# Patient Record
Sex: MALE | Race: WHITE | NOT HISPANIC OR LATINO | Employment: FULL TIME | URBAN - METROPOLITAN AREA
[De-identification: names, ages, dates, MRNs, and addresses within clinical notes are randomized per-mention and may not be internally consistent; named-entity substitution may affect disease eponyms.]

---

## 2019-03-16 ENCOUNTER — HOSPITAL ENCOUNTER (EMERGENCY)
Facility: HOSPITAL | Age: 49
Discharge: HOME | End: 2019-03-16
Attending: EMERGENCY MEDICINE | Admitting: EMERGENCY MEDICINE

## 2019-03-16 ENCOUNTER — APPOINTMENT (OUTPATIENT)
Dept: CT IMAGING | Facility: HOSPITAL | Age: 49
End: 2019-03-16

## 2019-03-16 VITALS
TEMPERATURE: 98.1 F | WEIGHT: 315 LBS | RESPIRATION RATE: 18 BRPM | HEIGHT: 75 IN | HEART RATE: 94 BPM | OXYGEN SATURATION: 92 % | BODY MASS INDEX: 39.17 KG/M2 | DIASTOLIC BLOOD PRESSURE: 60 MMHG | SYSTOLIC BLOOD PRESSURE: 145 MMHG

## 2019-03-16 DIAGNOSIS — L03.211 FACIAL CELLULITIS: ICD-10-CM

## 2019-03-16 DIAGNOSIS — K04.7 ABSCESS, PERIAPICAL: Primary | ICD-10-CM

## 2019-03-16 LAB
ANION GAP SERPL CALCULATED.3IONS-SCNC: 6 MMOL/L (ref 3–11)
BASOPHILS # BLD AUTO: 0.02 10*3/MM3 (ref 0–0.2)
BASOPHILS NFR BLD AUTO: 0.2 % (ref 0–1)
BUN BLD-MCNC: 13 MG/DL (ref 9–23)
BUN/CREAT SERPL: 13.1 (ref 7–25)
CALCIUM SPEC-SCNC: 9.1 MG/DL (ref 8.7–10.4)
CHLORIDE SERPL-SCNC: 107 MMOL/L (ref 99–109)
CO2 SERPL-SCNC: 26 MMOL/L (ref 20–31)
CREAT BLD-MCNC: 0.99 MG/DL (ref 0.6–1.3)
DEPRECATED RDW RBC AUTO: 44.7 FL (ref 37–54)
EOSINOPHIL # BLD AUTO: 0.28 10*3/MM3 (ref 0–0.3)
EOSINOPHIL NFR BLD AUTO: 3.3 % (ref 0–3)
ERYTHROCYTE [DISTWIDTH] IN BLOOD BY AUTOMATED COUNT: 13.5 % (ref 11.3–14.5)
GFR SERPL CREATININE-BSD FRML MDRD: 81 ML/MIN/1.73
GFR SERPL CREATININE-BSD FRML MDRD: 99 ML/MIN/1.73
GLUCOSE BLD-MCNC: 100 MG/DL (ref 70–100)
HCT VFR BLD AUTO: 47.8 % (ref 38.9–50.9)
HGB BLD-MCNC: 15.8 G/DL (ref 13.1–17.5)
IMM GRANULOCYTES # BLD AUTO: 0.02 10*3/MM3 (ref 0–0.05)
IMM GRANULOCYTES NFR BLD AUTO: 0.2 % (ref 0–0.6)
LYMPHOCYTES # BLD AUTO: 1.37 10*3/MM3 (ref 0.6–4.8)
LYMPHOCYTES NFR BLD AUTO: 16.2 % (ref 24–44)
MCH RBC QN AUTO: 29.8 PG (ref 27–31)
MCHC RBC AUTO-ENTMCNC: 33.1 G/DL (ref 32–36)
MCV RBC AUTO: 90.2 FL (ref 80–99)
MONOCYTES # BLD AUTO: 0.84 10*3/MM3 (ref 0–1)
MONOCYTES NFR BLD AUTO: 9.9 % (ref 0–12)
NEUTROPHILS # BLD AUTO: 5.97 10*3/MM3 (ref 1.5–8.3)
NEUTROPHILS NFR BLD AUTO: 70.4 % (ref 41–71)
PLATELET # BLD AUTO: 202 10*3/MM3 (ref 150–450)
PMV BLD AUTO: 9.2 FL (ref 6–12)
POTASSIUM BLD-SCNC: 3.6 MMOL/L (ref 3.5–5.5)
RBC # BLD AUTO: 5.3 10*6/MM3 (ref 4.2–5.76)
SODIUM BLD-SCNC: 139 MMOL/L (ref 132–146)
WBC NRBC COR # BLD: 8.48 10*3/MM3 (ref 3.5–10.8)

## 2019-03-16 PROCEDURE — 70487 CT MAXILLOFACIAL W/DYE: CPT

## 2019-03-16 PROCEDURE — 25010000002 IOPAMIDOL 61 % SOLUTION: Performed by: EMERGENCY MEDICINE

## 2019-03-16 PROCEDURE — 96375 TX/PRO/DX INJ NEW DRUG ADDON: CPT

## 2019-03-16 PROCEDURE — 85025 COMPLETE CBC W/AUTO DIFF WBC: CPT | Performed by: EMERGENCY MEDICINE

## 2019-03-16 PROCEDURE — 80048 BASIC METABOLIC PNL TOTAL CA: CPT | Performed by: EMERGENCY MEDICINE

## 2019-03-16 PROCEDURE — 96365 THER/PROPH/DIAG IV INF INIT: CPT

## 2019-03-16 PROCEDURE — 25010000002 CEFTRIAXONE PER 250 MG: Performed by: EMERGENCY MEDICINE

## 2019-03-16 PROCEDURE — 25010000002 DIPHENHYDRAMINE PER 50 MG: Performed by: EMERGENCY MEDICINE

## 2019-03-16 PROCEDURE — 99283 EMERGENCY DEPT VISIT LOW MDM: CPT

## 2019-03-16 RX ORDER — AMOXICILLIN AND CLAVULANATE POTASSIUM 875; 125 MG/1; MG/1
1 TABLET, FILM COATED ORAL 2 TIMES DAILY
Qty: 16 TABLET | Refills: 0 | Status: SHIPPED | OUTPATIENT
Start: 2019-03-16 | End: 2019-03-16

## 2019-03-16 RX ORDER — CLINDAMYCIN HYDROCHLORIDE 300 MG/1
300 CAPSULE ORAL 3 TIMES DAILY
Qty: 24 CAPSULE | Refills: 0 | Status: SHIPPED | OUTPATIENT
Start: 2019-03-16 | End: 2019-03-24

## 2019-03-16 RX ORDER — SACCHAROMYCES BOULARDII 250 MG
250 CAPSULE ORAL 2 TIMES DAILY
Qty: 20 CAPSULE | Refills: 0 | Status: SHIPPED | OUTPATIENT
Start: 2019-03-16

## 2019-03-16 RX ORDER — SODIUM CHLORIDE 0.9 % (FLUSH) 0.9 %
10 SYRINGE (ML) INJECTION AS NEEDED
Status: DISCONTINUED | OUTPATIENT
Start: 2019-03-16 | End: 2019-03-16 | Stop reason: HOSPADM

## 2019-03-16 RX ORDER — CEFTRIAXONE SODIUM 1 G/50ML
1 INJECTION, SOLUTION INTRAVENOUS ONCE
Status: COMPLETED | OUTPATIENT
Start: 2019-03-16 | End: 2019-03-16

## 2019-03-16 RX ORDER — DIPHENHYDRAMINE HYDROCHLORIDE 50 MG/ML
25 INJECTION INTRAMUSCULAR; INTRAVENOUS ONCE
Status: COMPLETED | OUTPATIENT
Start: 2019-03-16 | End: 2019-03-16

## 2019-03-16 RX ADMIN — IOPAMIDOL 95 ML: 612 INJECTION, SOLUTION INTRAVENOUS at 04:03

## 2019-03-16 RX ADMIN — CEFTRIAXONE SODIUM 1 G: 1 INJECTION, SOLUTION INTRAVENOUS at 06:20

## 2019-03-16 RX ADMIN — DIPHENHYDRAMINE HYDROCHLORIDE 25 MG: 50 INJECTION, SOLUTION INTRAMUSCULAR; INTRAVENOUS at 06:11

## 2019-03-16 NOTE — ED PROVIDER NOTES
Subjective   Patient is a very pleasant 48-year-old male who presents today with facial swelling and pain.  He states that one week ago he noticed his left ear was swollen and he had pain over his left sided frontal upper teeth (approximately over left maxillary incisors and canine).  He went to the dentist on Wednesday, approximately 2 days ago, and x-rays revealed no acute pathology.  Ultimately his dentist did not believe his symptoms were due to his teeth, which she had initially suspected.  Patient states that he had some old amoxicillin in his house which he started taking approximately 1 week ago when he noticed the left ear swelling.  The left ear swelling decreased with this.  After his dentist appointment on Wednesday, he followed up with his primary care physician who started him on Augmentin for the treatment of a possible sinus infection.  He states that he was doing relatively well until approximately 2 AM this morning when he awoke with more significant left facial swelling.  Given the worsening of symptoms despite being on Augmentin he decided to present presented to the emergency department for further evaluation.  He states that he recently started the Augmentin and is only taken 3 doses at this time.  Patient denies any shortness of breath, hoarse voice, or airway involvement.  He denies any chest pain, shortness of breath, nausea, vomiting, or abdominal pain.        Illness   Location:  Left face  Quality:  Pain  Severity:  Moderate  Onset quality:  Gradual  Duration: Present for 1 week, but worse over the past several hours.  Timing:  Intermittent  Progression:  Waxing and waning  Chronicity:  New  Context:  See above  Relieved by:  Temporarily improved with antibiotics, but now worse.  Worsened by:  Nothing  Ineffective treatments:  Amoxicillin  Associated symptoms: no abdominal pain, no chest pain, no congestion, no cough, no diarrhea, no ear pain (Minimal left ear erythema, but no pain), no  fatigue, no fever, no headaches, no loss of consciousness, no myalgias, no nausea, no rash, no rhinorrhea, no shortness of breath, no sore throat, no vomiting and no wheezing        Review of Systems   Constitutional: Negative for fatigue and fever.   HENT: Negative for congestion, ear pain (Minimal left ear erythema, but no pain), rhinorrhea and sore throat.    Respiratory: Negative for cough, shortness of breath and wheezing.    Cardiovascular: Negative for chest pain.   Gastrointestinal: Negative for abdominal pain, diarrhea, nausea and vomiting.   Musculoskeletal: Negative for myalgias.   Skin: Negative for rash.   Neurological: Negative for loss of consciousness and headaches.   All other systems reviewed and are negative.      History reviewed. No pertinent past medical history.    No Known Allergies    History reviewed. No pertinent surgical history.    History reviewed. No pertinent family history.    Social History     Socioeconomic History   • Marital status:      Spouse name: Not on file   • Number of children: Not on file   • Years of education: Not on file   • Highest education level: Not on file   Tobacco Use   • Smoking status: Never Smoker   • Smokeless tobacco: Never Used   Substance and Sexual Activity   • Alcohol use: Yes     Comment: SOCIALLY   • Drug use: No   • Sexual activity: Defer           Objective   Physical Exam   Constitutional: He is oriented to person, place, and time. He appears well-developed and well-nourished. No distress.   Patient is appropriately concerned, but in no acute distress.  Laughing, conversing, and pleasant.     HENT:   Head: Normocephalic and atraumatic.   Right Ear: External ear normal.   Nose: Nose normal.   Mouth/Throat: Oropharynx is clear and moist. No oropharyngeal exudate.   Left external ear has mild erythema.  No increased warmth. no swelling.  No tenderness to palpation or movement.    Bilateral tympanic membranes are normal.  No erythema or sign of  active infection.  Patient does have some very mild swelling of his left face including his left cheek below his eye down through his upper lip.  No swelling to the lower lip is appreciated.  No tongue or pharyngeal edema is appreciated.   Eyes: EOM are normal. Pupils are equal, round, and reactive to light.   Neck: Normal range of motion.   Cardiovascular: Normal rate, regular rhythm and normal heart sounds.   Pulmonary/Chest: Effort normal and breath sounds normal.   Abdominal: Soft. Bowel sounds are normal.   Musculoskeletal: Normal range of motion.   Neurological: He is alert and oriented to person, place, and time.   Skin: Skin is warm and dry. Capillary refill takes less than 2 seconds. He is not diaphoretic. No erythema.   Psychiatric: He has a normal mood and affect. His behavior is normal. Judgment and thought content normal.   Nursing note and vitals reviewed.      Procedures           ED Course  ED Course as of Mar 16 0638   Sat Mar 16, 2019   0636 Although patient is only had 3 doses of the of the Augmentin and has not truly failed with the Augmentin therapy at this point, given his lack of improvement am going to switch him to clindamycin.  I am asking him a probiotic.  I discussed with the patient and his spouse that oftentimes with facial cellulitis people will need to be admitted to the hospital for IV antibiotics.  I think it is reasonable, especially given his benign presentation, normal white blood cell count, and lack of fever, that we perform an outpatient treatment attempt at this time.  Patient understands that he should have a low threshold to return if symptoms are worsening.  [CP]      ED Course User Index  [CP] Oscar Willard DO      Recent Results (from the past 24 hour(s))   Basic Metabolic Panel    Collection Time: 03/16/19  3:25 AM   Result Value Ref Range    Glucose 100 70 - 100 mg/dL    BUN 13 9 - 23 mg/dL    Creatinine 0.99 0.60 - 1.30 mg/dL    Sodium 139 132 - 146 mmol/L    Potassium  3.6 3.5 - 5.5 mmol/L    Chloride 107 99 - 109 mmol/L    CO2 26.0 20.0 - 31.0 mmol/L    Calcium 9.1 8.7 - 10.4 mg/dL    eGFR  African Amer 99 >60 mL/min/1.73    eGFR Non African Amer 81 >60 mL/min/1.73    BUN/Creatinine Ratio 13.1 7.0 - 25.0    Anion Gap 6.0 3.0 - 11.0 mmol/L   CBC Auto Differential    Collection Time: 03/16/19  3:25 AM   Result Value Ref Range    WBC 8.48 3.50 - 10.80 10*3/mm3    RBC 5.30 4.20 - 5.76 10*6/mm3    Hemoglobin 15.8 13.1 - 17.5 g/dL    Hematocrit 47.8 38.9 - 50.9 %    MCV 90.2 80.0 - 99.0 fL    MCH 29.8 27.0 - 31.0 pg    MCHC 33.1 32.0 - 36.0 g/dL    RDW 13.5 11.3 - 14.5 %    RDW-SD 44.7 37.0 - 54.0 fl    MPV 9.2 6.0 - 12.0 fL    Platelets 202 150 - 450 10*3/mm3    Neutrophil % 70.4 41.0 - 71.0 %    Lymphocyte % 16.2 (L) 24.0 - 44.0 %    Monocyte % 9.9 0.0 - 12.0 %    Eosinophil % 3.3 (H) 0.0 - 3.0 %    Basophil % 0.2 0.0 - 1.0 %    Immature Grans % 0.2 0.0 - 0.6 %    Neutrophils, Absolute 5.97 1.50 - 8.30 10*3/mm3    Lymphocytes, Absolute 1.37 0.60 - 4.80 10*3/mm3    Monocytes, Absolute 0.84 0.00 - 1.00 10*3/mm3    Eosinophils, Absolute 0.28 0.00 - 0.30 10*3/mm3    Basophils, Absolute 0.02 0.00 - 0.20 10*3/mm3    Immature Grans, Absolute 0.02 0.00 - 0.05 10*3/mm3     Note: In addition to lab results from this visit, the labs listed above may include labs taken at another facility or during a different encounter within the last 24 hours. Please correlate lab times with ED admission and discharge times for further clarification of the services performed during this visit.    CT Maxillofacial With Contrast   Final Result   LEFT maxillofacial and periorbital CELLULITIS with extensive phlegmonous    changes along the alveolar margin of the LEFT maxilla and underlying root    abscess described above.       THIS DOCUMENT HAS BEEN ELECTRONICALLY SIGNED BY VICKY LLAMAS MD        Vitals:    03/16/19 0245   BP: (!) 186/101   BP Location: Left arm   Patient Position: Sitting   Pulse: 94   Resp: 18  "  Temp: 98.1 °F (36.7 °C)   TempSrc: Oral   SpO2: 98%   Weight: (!) 145 kg (320 lb)   Height: 190.5 cm (75\")     Medications   sodium chloride 0.9 % flush 10 mL (not administered)   cefTRIAXone (ROCEPHIN) IVPB 1 g (1 g Intravenous New Bag 3/16/19 0620)   iopamidol (ISOVUE-300) 61 % injection 100 mL (95 mL Intravenous Given 3/16/19 0403)   diphenhydrAMINE (BENADRYL) injection 25 mg (25 mg Intravenous Given 3/16/19 0611)     ECG/EMG Results (last 24 hours)     ** No results found for the last 24 hours. **        No orders to display                   MDM      Final diagnoses:   Abscess, periapical   Facial cellulitis            Oscar Willard DO  03/16/19 0638    "